# Patient Record
Sex: MALE | Race: WHITE | Employment: UNEMPLOYED | URBAN - METROPOLITAN AREA
[De-identification: names, ages, dates, MRNs, and addresses within clinical notes are randomized per-mention and may not be internally consistent; named-entity substitution may affect disease eponyms.]

---

## 2020-07-07 ENCOUNTER — HOSPITAL ENCOUNTER (EMERGENCY)
Age: 35
Discharge: HOME | End: 2020-07-07
Attending: EMERGENCY MEDICINE
Payer: COMMERCIAL

## 2020-07-07 VITALS
OXYGEN SATURATION: 96 % | HEART RATE: 109 BPM | TEMPERATURE: 98.6 F | DIASTOLIC BLOOD PRESSURE: 96 MMHG | WEIGHT: 240 LBS | SYSTOLIC BLOOD PRESSURE: 144 MMHG | HEIGHT: 69 IN | RESPIRATION RATE: 17 BRPM | BODY MASS INDEX: 35.55 KG/M2

## 2020-07-07 DIAGNOSIS — R44.1 VISUAL HALLUCINATION: ICD-10-CM

## 2020-07-07 DIAGNOSIS — R44.0 AUDITORY HALLUCINATION: Primary | ICD-10-CM

## 2020-07-07 LAB
AMPHET UR QL SCN: NEGATIVE
ANION GAP SERPL CALC-SCNC: 4 MMOL/L (ref 3–18)
BARBITURATES UR QL SCN: NEGATIVE
BASOPHILS # BLD: 0 K/UL (ref 0–0.06)
BASOPHILS NFR BLD: 0 % (ref 0–3)
BENZODIAZ UR QL: NEGATIVE
BUN SERPL-MCNC: 10 MG/DL (ref 7–18)
BUN/CREAT SERPL: 9 (ref 12–20)
CALCIUM SERPL-MCNC: 8.7 MG/DL (ref 8.5–10.1)
CANNABINOIDS UR QL SCN: POSITIVE
CHLORIDE SERPL-SCNC: 107 MMOL/L (ref 100–111)
CO2 SERPL-SCNC: 25 MMOL/L (ref 21–32)
COCAINE UR QL SCN: NEGATIVE
CREAT SERPL-MCNC: 1.17 MG/DL (ref 0.6–1.3)
DIFFERENTIAL METHOD BLD: ABNORMAL
EOSINOPHIL # BLD: 0.7 K/UL (ref 0–0.4)
EOSINOPHIL NFR BLD: 6 % (ref 0–5)
ERYTHROCYTE [DISTWIDTH] IN BLOOD BY AUTOMATED COUNT: 16.8 % (ref 11.6–14.5)
ETHANOL SERPL-MCNC: <3 MG/DL (ref 0–3)
GLUCOSE SERPL-MCNC: 147 MG/DL (ref 74–99)
HCT VFR BLD AUTO: 41.1 % (ref 36–48)
HDSCOM,HDSCOM: ABNORMAL
HGB BLD-MCNC: 13.6 G/DL (ref 13–16)
LYMPHOCYTES # BLD: 3.3 K/UL (ref 0.8–3.5)
LYMPHOCYTES NFR BLD: 27 % (ref 20–51)
MCH RBC QN AUTO: 20.7 PG (ref 24–34)
MCHC RBC AUTO-ENTMCNC: 33.1 G/DL (ref 31–37)
MCV RBC AUTO: 62.5 FL (ref 74–97)
METHADONE UR QL: NEGATIVE
MONOCYTES # BLD: 0.5 K/UL (ref 0–1)
MONOCYTES NFR BLD: 4 % (ref 2–9)
NEUTS SEG # BLD: 7.8 K/UL (ref 1.8–8)
NEUTS SEG NFR BLD: 63 % (ref 42–75)
OPIATES UR QL: NEGATIVE
PCP UR QL: NEGATIVE
PLATELET # BLD AUTO: 204 K/UL (ref 135–420)
PLATELET COMMENTS,PCOM: ABNORMAL
POTASSIUM SERPL-SCNC: 3.8 MMOL/L (ref 3.5–5.5)
RBC # BLD AUTO: 6.58 M/UL (ref 4.7–5.5)
RBC MORPH BLD: ABNORMAL
RBC MORPH BLD: ABNORMAL
SODIUM SERPL-SCNC: 136 MMOL/L (ref 136–145)
WBC # BLD AUTO: 12.3 K/UL (ref 4.6–13.2)

## 2020-07-07 PROCEDURE — 80048 BASIC METABOLIC PNL TOTAL CA: CPT

## 2020-07-07 PROCEDURE — 99282 EMERGENCY DEPT VISIT SF MDM: CPT

## 2020-07-07 PROCEDURE — 85025 COMPLETE CBC W/AUTO DIFF WBC: CPT

## 2020-07-07 PROCEDURE — 80307 DRUG TEST PRSMV CHEM ANLYZR: CPT

## 2020-07-07 NOTE — BSMART NOTE
Comprehensive Assessment Integrated Summary Patient is a 36year old who presented to the emergency room with c/o \"I went to my medication management appointment, and my doctor told me to come to the emergency room, because I said. Shivani Cid I was hearing voices telling me to hurt myself and others. \" Patient said that he experienced the auditory hallucinations about 2 days ago, and once this morning. Patient added that he also experiences visual hallucinations at times. ..l see dark shapes and shadows. \" Patient verbalized that he has \"no thoughts, plans, or intentions of harm towards self or others, and I only came here. ..because I was told to come here. \" Patient also stated that he is \"able to manage and block-out the hallucinations. \" 
 
Patient says the he is seen by [facility]un[facility] [address] [place], [place] [address] and he has another appointment 2 weeks from today with Ms. Ana Fernandez. Patient said that he had a medication change today, and he will start the new medications tonight. \"Meds changed from Seroquel to Zyprexa. \" 
 
Patient said that he smokes \"marijuana and it help me to be calm/relax. I was suppose to take Atarax for anxiety, but I rather smoke marijuana. \" Patient denied use of other illicit substances. Patient encouraged to abstain from smoking marijuana. Mental Status Exam 
 
The patient's appearance clean, well-groomed. The patient's behavior is calm, cooperative. The patient is oriented to time, place, person and situation. The patient's speech shows no evidence of impairment. The patient's mood \"feel all right. \"  The range of affect shows no evidence of impairment. The patient's thought content demonstrates no evidence of impairment. The thought process shows no evidence of impairment. The patient's perception demonstrated changes in the following:  auditory  visual. The patient's memory shows no evidence of impairment.   The patient's appetite shows no evidence of impairment. The patient's sleep has evidence of insomnia, \"sleep abut 5-6 hrs/night. The patient's insight shows no evidence of impairment. The patient's judgement shows no evidence of impairment. Access to weapons: Denied Outpatient Care: \"[facility], next appointment 7/21/2020\" Inpatient Services: [place], Feb, 2020\" Contact/Support Person: Augustus Sultana' [phone]\" Disposition Discussed with Delmy iFsher, [address], and on-call psychiatrist, patient does not meet criteria for acute psychiatric admission. Patient will follow-up with outpatient provider. Patient discharged per ED.   
 
Amanda Lopez, RN, BSN

## 2020-07-07 NOTE — ED TRIAGE NOTES
Pt states that he was advised by his psychiatrist to come to the ED while at his medication management appointment. Pt reports having auditory and visual hallucinations. Denies SI or HI.

## 2020-07-07 NOTE — DISCHARGE INSTRUCTIONS
[employer] Activation    Thank you for requesting access to [employer]. Please follow the instructions below to securely access and download your online medical record. [employer] allows you to send messages to your doctor, view your test results, renew your prescriptions, schedule appointments, and more. How Do I Sign Up? 1. In your internet browser, go to [email or web address]  2. Click on the First Time User? Click Here link in the Sign In box. You will be redirect to the New Member Sign Up page. 3. Enter your [employer] Access Code exactly as it appears below. You will not need to use this code after youve completed the sign-up process. If you do not sign up before the expiration date, you must request a new code. [employer] Access Code: Arvell Race  Expires: 2020  2:22 PM (This is the date your [employer] access code will )    4. Enter the last four digits of your Social Security Number (xxxx) and Date of Birth (mm/dd/yyyy) as indicated and click Submit. You will be taken to the next sign-up page. 5. Create a [employer] ID. This will be your [employer] login ID and cannot be changed, so think of one that is secure and easy to remember. 6. Create a [employer] password. You can change your password at any time. 7. Enter your Password Reset Question and Answer. This can be used at a later time if you forget your password. 8. Enter your e-mail address. You will receive e-mail notification when new information is available in [address] 9. Click Sign Up. You can now view and download portions of your medical record. 10. Click the Download Summary menu link to download a portable copy of your medical information. Additional Information    If you have questions, please visit the Frequently Asked Questions section of the [employer] website at [email or web address] Remember, [employer] is NOT to be used for urgent needs. For medical emergencies, dial 911.

## 2021-08-17 NOTE — ED PROVIDER NOTES
EMERGENCY DEPARTMENT HISTORY AND PHYSICAL EXAM    Date: 7/7/2020  Patient Name: Nanette Gandhi II    History of Presenting Illness     Chief Complaint   Patient presents with    Mental Health Problem         History Provided By: Patient    Chief Complaint: Auditory and visual hallucinations  Duration: 1 month  Timing: Constant   Location: Visual and auditory  Quality: N/A  Severity: Moderate to severe  Modifying Factors: N/A  Associated Symptoms: none       Additional History (Context): Eli Prado is a 36 y.o. male with a history of schizoaffective disorder who presents today for auditory visual hallucinations. Patient states these have been going on for a while, states he disclosed this today at his psychiatry appointment. Patient was advised to come to the ED for evaluation. Patient has been on his mental health medications for the past 6 weeks. No alterations to these medications have occurred. Admits to marijuana abuse but denies any other substance abuse, tobacco abuse or alcohol abuse. Denies any recent illness, cough fever or chills. Denies any SI or HI.      PCP: None        Past History     Past Medical History:  No past medical history on file. Past Surgical History:  No past surgical history on file. Family History:  No family history on file. Social History:  Social History     Tobacco Use    Smoking status: Not on file   Substance Use Topics    Alcohol use: Not on file    Drug use: Not on file       Allergies:  No Known Allergies      Review of Systems   Review of Systems   Constitutional: Negative for chills and fever. HENT: Negative for congestion, rhinorrhea and sore throat. Respiratory: Negative for cough and shortness of breath. Cardiovascular: Negative for chest pain. Gastrointestinal: Negative for abdominal pain, blood in stool, constipation, diarrhea, nausea and vomiting. Genitourinary: Negative for dysuria, frequency and hematuria. no Musculoskeletal: Negative for back pain and myalgias. Skin: Negative for rash and wound. Neurological: Negative for dizziness and headaches. Psychiatric/Behavioral: Positive for hallucinations. All other systems reviewed and are negative. All Other Systems Negative  Physical Exam     Vitals:    07/07/20 1424   BP: (!) 144/96   Pulse: (!) 109   Resp: 17   Temp: 98.6 °F (37 °C)   SpO2: 96%   Weight: 108.9 kg (240 lb)   Height: 5' 9\" (1.753 m)     Physical Exam  Vitals signs and nursing note reviewed. Constitutional:       General: He is not in acute distress. Appearance: He is well-developed. He is not diaphoretic. HENT:      Head: Normocephalic and atraumatic. Eyes:      Conjunctiva/sclera: Conjunctivae normal.   Neck:      Musculoskeletal: Normal range of motion and neck supple. Cardiovascular:      Rate and Rhythm: Normal rate and regular rhythm. Heart sounds: Normal heart sounds. Pulmonary:      Effort: Pulmonary effort is normal. No respiratory distress. Breath sounds: Normal breath sounds. Chest:      Chest wall: No tenderness. Abdominal:      General: Bowel sounds are normal. There is no distension. Palpations: Abdomen is soft. Tenderness: There is no abdominal tenderness. There is no guarding or rebound. Musculoskeletal: Normal range of motion. General: No deformity. Skin:     General: Skin is warm and dry. Neurological:      Mental Status: He is alert and oriented to person, place, and time. Psychiatric:         Attention and Perception: Attention normal.         Mood and Affect: Mood and affect normal.         Speech: Speech normal.         Behavior: Behavior normal. Behavior is cooperative. Thought Content:  Thought content normal.         Cognition and Memory: Cognition normal.         Judgment: Judgment normal.           Diagnostic Study Results     Labs -     Recent Results (from the past 12 hour(s))   CBC WITH AUTOMATED DIFF Collection Time: 07/07/20  2:38 PM   Result Value Ref Range    WBC 12.3 4.6 - 13.2 K/uL    RBC 6.58 (H) 4.70 - 5.50 M/uL    HGB 13.6 13.0 - 16.0 g/dL    HCT 41.1 36.0 - 48.0 %    MCV 62.5 (L) 74.0 - 97.0 FL    MCH 20.7 (L) 24.0 - 34.0 PG    MCHC 33.1 31.0 - 37.0 g/dL    RDW 16.8 (H) 11.6 - 14.5 %    PLATELET 482 631 - 201 K/uL    NEUTROPHILS 63 42 - 75 %    LYMPHOCYTES 27 20 - 51 %    MONOCYTES 4 2 - 9 %    EOSINOPHILS 6 (H) 0 - 5 %    BASOPHILS 0 0 - 3 %    ABS. NEUTROPHILS 7.8 1.8 - 8.0 K/UL    ABS. LYMPHOCYTES 3.3 0.8 - 3.5 K/UL    ABS. MONOCYTES 0.5 0 - 1.0 K/UL    ABS. EOSINOPHILS 0.7 (H) 0.0 - 0.4 K/UL    ABS.  BASOPHILS 0.0 0.0 - 0.06 K/UL    DF MANUAL      PLATELET COMMENTS ADEQUATE PLATELETS      RBC COMMENTS ANISOCYTOSIS  1+        RBC COMMENTS OVALOCYTES  FEW       METABOLIC PANEL, BASIC    Collection Time: 07/07/20  2:38 PM   Result Value Ref Range    Sodium 136 136 - 145 mmol/L    Potassium 3.8 3.5 - 5.5 mmol/L    Chloride 107 100 - 111 mmol/L    CO2 25 21 - 32 mmol/L    Anion gap 4 3.0 - 18 mmol/L    Glucose 147 (H) 74 - 99 mg/dL    BUN 10 7.0 - 18 MG/DL    Creatinine 1.17 0.6 - 1.3 MG/DL    BUN/Creatinine ratio 9 (L) 12 - 20      GFR est AA >60 >60 ml/min/1.73m2    GFR est non-AA >60 >60 ml/min/1.73m2    Calcium 8.7 8.5 - 10.1 MG/DL   ETHYL ALCOHOL    Collection Time: 07/07/20  2:38 PM   Result Value Ref Range    ALCOHOL(ETHYL),SERUM <3 0 - 3 MG/DL   DRUG SCREEN, URINE    Collection Time: 07/07/20  3:45 PM   Result Value Ref Range    BENZODIAZEPINES Negative NEG      BARBITURATES Negative NEG      THC (TH-CANNABINOL) Positive (A) NEG      OPIATES Negative NEG      PCP(PHENCYCLIDINE) Negative NEG      COCAINE Negative NEG      AMPHETAMINES Negative NEG      METHADONE Negative NEG      HDSCOM (NOTE)        Radiologic Studies -   No orders to display     CT Results  (Last 48 hours)    None        CXR Results  (Last 48 hours)    None            Medical Decision Making   I am the first provider for this patient. I reviewed the vital signs, available nursing notes, past medical history, past surgical history, family history and social history. Vital Signs-Reviewed the patient's vital signs. Records Reviewed: Nursing Notes and Old Medical Records     Procedures: None   Procedures    Provider Notes (Medical Decision Making):     Differential Diagnosis: substance abuse, alcohol intoxication, substance withdrawal, acute psychotic episode, anxiety, panic disorder, robbie, undifferentiated schizophrenia, depression, SI, HI, medication non-compliance, other mood disorder      Plan: We will medically clear and consult crisis      4:18 PM  Per hair Merida, she cannot evaluate until patient has been roomed. 5:41 PM : Pt care transferred to Delmy NOGUEIRA,ED provider. History of patient complaint(s), available diagnostic reports and current treatment plan has been discussed thoroughly. Bedside rounding on patient occured : no . Intended disposition of patient : To be determined  Pending diagnostics reports and/or labs (please list): Crisis evaluation    6:00 PM Assumed care of the pt at this time. Discussed with MIRLANDE Koo concerning patient [facility], standard discussion of reason for visit, HPI, ROS, PE, and current results available. Recommendation for Crisis eval to dispo the pt. Delmy Fisher PA-C     6:36 PM Pt has been seen and evaluated by Crisis worker Warden Cortes and is recommended for d/c home. He has an apt with his psychiatrist in 2 weeks. Delmy Fisher PA-C          MED RECONCILIATION:  No current facility-administered medications for this encounter. No current outpatient medications on file. Disposition:  D/c home     Diagnosis     Clinical Impression:   1. Auditory hallucination    2. Visual hallucination          \"Please note that this dictation was completed with AUTOFACT, the [employer] voice recognition software.  Quite often unanticipated grammatical, syntax, homophones, and other interpretive errors are inadvertently transcribed by the computer software. Please disregard these errors. Please excuse any errors that have escaped final proofreading. \"